# Patient Record
Sex: FEMALE | Race: WHITE | URBAN - METROPOLITAN AREA
[De-identification: names, ages, dates, MRNs, and addresses within clinical notes are randomized per-mention and may not be internally consistent; named-entity substitution may affect disease eponyms.]

---

## 2023-05-23 ENCOUNTER — OFFICE VISIT (OUTPATIENT)
Facility: LOCATION | Age: 68
End: 2023-05-23
Payer: COMMERCIAL

## 2023-05-23 DIAGNOSIS — H04.123 DRY EYE SYNDROME OF BILATERAL LACRIMAL GLANDS: ICD-10-CM

## 2023-05-23 DIAGNOSIS — H26.493 OTHER SECONDARY CATARACT, BILATERAL: ICD-10-CM

## 2023-05-23 DIAGNOSIS — H35.3131 NONEXUDATIVE AGE-RELATED MACULAR DEGENERATION BILATERAL EARL: ICD-10-CM

## 2023-05-23 DIAGNOSIS — H54.8 LEGAL BLINDNESS, AS DEFINED IN USA: ICD-10-CM

## 2023-05-23 DIAGNOSIS — H40.89 OTHER SPECIFIED GLAUCOMA: Primary | ICD-10-CM

## 2023-05-23 PROCEDURE — 92134 CPTRZ OPH DX IMG PST SGM RTA: CPT | Performed by: OPHTHALMOLOGY

## 2023-05-23 PROCEDURE — 99213 OFFICE O/P EST LOW 20 MIN: CPT | Performed by: OPHTHALMOLOGY

## 2023-05-23 RX ORDER — LATANOPROST 50 UG/ML
0.005 % SOLUTION OPHTHALMIC
Qty: 7.5 | Refills: 3 | Status: INACTIVE
Start: 2023-05-23 | End: 2024-05-21

## 2023-05-23 RX ORDER — DORZOLAMIDE HYDROCHLORIDE AND TIMOLOL MALEATE 20; 5 MG/ML; MG/ML
SOLUTION/ DROPS OPHTHALMIC
Qty: 30 | Refills: 4 | Status: ACTIVE
Start: 2023-05-23

## 2023-05-23 ASSESSMENT — INTRAOCULAR PRESSURE
OS: 13
OD: 11

## 2023-05-23 NOTE — IMPRESSION/PLAN
Impression: Examination revealed dry eye syndrome secondary to tear deficiencies. S/p Ext Duration BLL (5/25/2022). Trace SPK OU. Plan: Continue ATs TID OU.

## 2023-05-23 NOTE — IMPRESSION/PLAN
Impression: Examination revealed posterior capsule opacification. 2+ PCO OD, 3+ PCO OS. Plan: Informed patient presence of PCO OS>OD contributing to blurry VA. Discussed R/B/A/Is of YAG PC to clear PCO and improve VA. Plan:
Schedule YAG PC OS then OD. Advised patient to hold off obtaining new Rx lenses until after YAG PC OU.
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
## 2023-05-23 NOTE — IMPRESSION/PLAN
Impression: Examination revealed mild age-related macular degeneration. OCT Mac 5/2023: Trace CME OU. Improved to stable OU. OCT/Mac performed and reviewed. Plan: AMD discussed, patient education materials and AREDS results reviewed, suggest monitoring with [place] grid QD, call stat if changed. Recommend no smoking and nutritional supplement use. Patient advised to continue care with primary care physician. Patient advised of condition and to monitor signs and symptoms of changes.

## 2023-06-13 ENCOUNTER — OFFICE VISIT (OUTPATIENT)
Facility: LOCATION | Age: 68
End: 2023-06-13
Payer: COMMERCIAL

## 2023-06-13 DIAGNOSIS — H26.493 OTHER SECONDARY CATARACT, BILATERAL: Primary | ICD-10-CM

## 2023-06-13 PROCEDURE — 66821 AFTER CATARACT LASER SURGERY: CPT | Performed by: OPHTHALMOLOGY

## 2023-06-13 ASSESSMENT — INTRAOCULAR PRESSURE
OD: 9
OS: 11
OS: 10

## 2023-06-13 NOTE — IMPRESSION/PLAN
Impression: Patient presents today for YAG PC OS. Examination revealed posterior capsule opacification. 2+ PCO OD, 3+ PCO OS. Plan: Previous:
Informed patient presence of PCO OS>OD contributing to blurry VA. Discussed R/B/A/Is of YAG PC to clear PCO and improve VA. Today: The risks, benefits, and alternatives were explained, including (but not limited to) the risks of corneal abrasion, inflammation, pain, worsening vision, transient and/or permanent floaters, retinal tears, retinal detachment, IOP spike, and possible need for further procedures. YAG PC OS performed today without any complications. Plan:
RTC as scheduled for YAG PC OD. Advised patient to hold off obtaining new Rx lenses until after YAG PC OU.

## 2023-06-27 ENCOUNTER — PROCEDURE (OUTPATIENT)
Facility: LOCATION | Age: 68
End: 2023-06-27
Payer: COMMERCIAL

## 2023-06-27 DIAGNOSIS — H26.491 OTHER SECONDARY CATARACT, RIGHT EYE: Primary | ICD-10-CM

## 2023-06-27 PROCEDURE — 66821 AFTER CATARACT LASER SURGERY: CPT | Performed by: OPHTHALMOLOGY

## 2023-06-27 ASSESSMENT — INTRAOCULAR PRESSURE
OS: 11
OD: 10

## 2023-06-27 NOTE — IMPRESSION/PLAN
Impression: Patient presents today for YAG PC OD. Examination revealed posterior capsule opacification. 2+ PCO OD Plan: Previous:
Informed patient presence of PCO OS>OD contributing to blurry VA. Discussed R/B/A/Is of YAG PC to clear PCO and improve VA. Today: The risks, benefits, and alternatives were explained, including (but not limited to) the risks of corneal abrasion, inflammation, pain, worsening vision, transient and/or permanent floaters, retinal tears, retinal detachment, IOP spike, and possible need for further procedures. YAG PC OD performed today without any complications. Plan:
RTC in 4 weeks for IOP check, MR OU, and DFE OU. Advised patient to hold off obtaining new Rx lenses until after YAG PC OU.

## 2023-07-25 ENCOUNTER — POST-OPERATIVE VISIT (OUTPATIENT)
Facility: LOCATION | Age: 68
End: 2023-07-25
Payer: COMMERCIAL

## 2023-07-25 DIAGNOSIS — Z98.890 OTHER SPECIFIED POSTPROCEDURAL STATE: Primary | ICD-10-CM

## 2023-07-25 DIAGNOSIS — H40.89 OTHER SPECIFIED GLAUCOMA: ICD-10-CM

## 2023-07-25 PROCEDURE — 99024 POSTOP FOLLOW-UP VISIT: CPT | Performed by: OPHTHALMOLOGY

## 2023-07-25 ASSESSMENT — INTRAOCULAR PRESSURE
OS: 6
OD: 6

## 2023-10-10 ENCOUNTER — OFFICE VISIT (OUTPATIENT)
Facility: LOCATION | Age: 68
End: 2023-10-10
Payer: COMMERCIAL

## 2023-10-10 DIAGNOSIS — H40.89 OTHER SPECIFIED GLAUCOMA: Primary | ICD-10-CM

## 2023-10-10 DIAGNOSIS — H04.123 DRY EYE SYNDROME OF BILATERAL LACRIMAL GLANDS: ICD-10-CM

## 2023-10-10 DIAGNOSIS — H54.8 LEGAL BLINDNESS, AS DEFINED IN USA: ICD-10-CM

## 2023-10-10 DIAGNOSIS — H35.3131 NONEXUDATIVE AGE-RELATED MACULAR DEGENERATION BILATERAL EARL: ICD-10-CM

## 2023-10-10 PROCEDURE — 99214 OFFICE O/P EST MOD 30 MIN: CPT | Performed by: OPHTHALMOLOGY

## 2023-10-10 PROCEDURE — 92020 GONIOSCOPY: CPT | Performed by: OPHTHALMOLOGY

## 2023-10-10 PROCEDURE — 92134 CPTRZ OPH DX IMG PST SGM RTA: CPT | Performed by: OPHTHALMOLOGY

## 2023-10-10 ASSESSMENT — INTRAOCULAR PRESSURE
OS: 9
OS: 8
OD: 8

## 2024-07-30 ENCOUNTER — OFFICE VISIT (OUTPATIENT)
Facility: LOCATION | Age: 69
End: 2024-07-30
Payer: COMMERCIAL

## 2024-07-30 DIAGNOSIS — H40.89 OTHER SPECIFIED GLAUCOMA: Primary | ICD-10-CM

## 2024-07-30 DIAGNOSIS — H04.123 DRY EYE SYNDROME OF BILATERAL LACRIMAL GLANDS: ICD-10-CM

## 2024-07-30 DIAGNOSIS — H54.8 LEGAL BLINDNESS, AS DEFINED IN USA: ICD-10-CM

## 2024-07-30 DIAGNOSIS — H35.3131 NONEXUDATIVE AGE-RELATED MACULAR DEGENERATION BILATERAL EARL: ICD-10-CM

## 2024-07-30 PROCEDURE — 92134 CPTRZ OPH DX IMG PST SGM RTA: CPT | Performed by: OPHTHALMOLOGY

## 2024-07-30 PROCEDURE — 99214 OFFICE O/P EST MOD 30 MIN: CPT | Performed by: OPHTHALMOLOGY

## 2024-07-30 ASSESSMENT — INTRAOCULAR PRESSURE
OD: 12
OS: 13

## 2025-06-26 ENCOUNTER — OFFICE VISIT (OUTPATIENT)
Facility: LOCATION | Age: 70
End: 2025-06-26
Payer: COMMERCIAL

## 2025-06-26 DIAGNOSIS — H54.8 LEGAL BLINDNESS, AS DEFINED IN USA: ICD-10-CM

## 2025-06-26 DIAGNOSIS — H04.123 DRY EYE SYNDROME OF BILATERAL LACRIMAL GLANDS: ICD-10-CM

## 2025-06-26 DIAGNOSIS — H40.89 OTHER SPECIFIED GLAUCOMA: Primary | ICD-10-CM

## 2025-06-26 DIAGNOSIS — H35.3131 NONEXUDATIVE AGE-RELATED MACULAR DEGENERATION BILATERAL EARL: ICD-10-CM

## 2025-06-26 PROCEDURE — 92134 CPTRZ OPH DX IMG PST SGM RTA: CPT | Performed by: STUDENT IN AN ORGANIZED HEALTH CARE EDUCATION/TRAINING PROGRAM

## 2025-06-26 PROCEDURE — 99213 OFFICE O/P EST LOW 20 MIN: CPT | Performed by: STUDENT IN AN ORGANIZED HEALTH CARE EDUCATION/TRAINING PROGRAM

## 2025-06-26 RX ORDER — DORZOLAMIDE HYDROCHLORIDE AND TIMOLOL MALEATE 20; 5 MG/ML; MG/ML
SOLUTION/ DROPS OPHTHALMIC
Qty: 5 | Refills: 11 | Status: ACTIVE
Start: 2025-06-26

## 2025-06-26 ASSESSMENT — INTRAOCULAR PRESSURE
OS: 12
OS: 9
OD: 10

## 2025-06-26 ASSESSMENT — VISUAL ACUITY
OS: 20/80
OD: 20/30